# Patient Record
Sex: MALE | Race: WHITE | NOT HISPANIC OR LATINO | URBAN - METROPOLITAN AREA
[De-identification: names, ages, dates, MRNs, and addresses within clinical notes are randomized per-mention and may not be internally consistent; named-entity substitution may affect disease eponyms.]

---

## 2017-02-22 ENCOUNTER — OUTPATIENT (OUTPATIENT)
Dept: OUTPATIENT SERVICES | Facility: HOSPITAL | Age: 77
LOS: 1 days | End: 2017-02-22

## 2017-09-20 ENCOUNTER — APPOINTMENT (OUTPATIENT)
Dept: CARDIOLOGY | Facility: CLINIC | Age: 77
End: 2017-09-20
Payer: MEDICARE

## 2017-09-20 PROBLEM — Z00.00 ENCOUNTER FOR PREVENTIVE HEALTH EXAMINATION: Status: ACTIVE | Noted: 2017-09-20

## 2017-09-20 PROCEDURE — 99213 OFFICE O/P EST LOW 20 MIN: CPT

## 2017-09-20 PROCEDURE — 93000 ELECTROCARDIOGRAM COMPLETE: CPT

## 2017-09-28 ENCOUNTER — APPOINTMENT (OUTPATIENT)
Dept: CARDIOLOGY | Facility: CLINIC | Age: 77
End: 2017-09-28

## 2017-10-02 ENCOUNTER — APPOINTMENT (OUTPATIENT)
Dept: CARDIOLOGY | Facility: CLINIC | Age: 77
End: 2017-10-02
Payer: MEDICARE

## 2017-10-02 PROCEDURE — 93306 TTE W/DOPPLER COMPLETE: CPT

## 2017-10-05 ENCOUNTER — APPOINTMENT (OUTPATIENT)
Dept: CARDIOLOGY | Facility: CLINIC | Age: 77
End: 2017-10-05
Payer: MEDICARE

## 2017-10-05 PROCEDURE — 99213 OFFICE O/P EST LOW 20 MIN: CPT

## 2019-04-02 ENCOUNTER — NON-APPOINTMENT (OUTPATIENT)
Age: 79
End: 2019-04-02

## 2019-04-02 ENCOUNTER — APPOINTMENT (OUTPATIENT)
Dept: CARDIOLOGY | Facility: CLINIC | Age: 79
End: 2019-04-02
Payer: MEDICARE

## 2019-04-02 VITALS
HEART RATE: 68 BPM | HEIGHT: 69 IN | WEIGHT: 165 LBS | BODY MASS INDEX: 24.44 KG/M2 | OXYGEN SATURATION: 99 % | DIASTOLIC BLOOD PRESSURE: 60 MMHG | SYSTOLIC BLOOD PRESSURE: 136 MMHG

## 2019-04-02 PROCEDURE — 93000 ELECTROCARDIOGRAM COMPLETE: CPT

## 2019-04-02 PROCEDURE — 99215 OFFICE O/P EST HI 40 MIN: CPT

## 2019-04-02 RX ORDER — CHOLECALCIFEROL (VITAMIN D3) 125 MCG
5000 CAPSULE ORAL
Refills: 0 | Status: ACTIVE | COMMUNITY

## 2019-04-02 RX ORDER — OMEPRAZOLE 20 MG/1
20 CAPSULE, DELAYED RELEASE ORAL
Refills: 0 | Status: ACTIVE | COMMUNITY

## 2019-04-02 NOTE — PHYSICAL EXAM
[General Appearance - Well Developed] : well developed [Normal Appearance] : normal appearance [Well Groomed] : well groomed [General Appearance - Well Nourished] : well nourished [No Deformities] : no deformities [General Appearance - In No Acute Distress] : no acute distress [Normal Conjunctiva] : the conjunctiva exhibited no abnormalities [Eyelids - No Xanthelasma] : the eyelids demonstrated no xanthelasmas [Normal Oral Mucosa] : normal oral mucosa [No Oral Pallor] : no oral pallor [No Oral Cyanosis] : no oral cyanosis [Normal Jugular Venous A Waves Present] : normal jugular venous A waves present [Normal Jugular Venous V Waves Present] : normal jugular venous V waves present [No Jugular Venous Whittaker A Waves] : no jugular venous whittaker A waves [Respiration, Rhythm And Depth] : normal respiratory rhythm and effort [Exaggerated Use Of Accessory Muscles For Inspiration] : no accessory muscle use [Auscultation Breath Sounds / Voice Sounds] : lungs were clear to auscultation bilaterally [Heart Rate And Rhythm] : heart rate and rhythm were normal [Heart Sounds] : normal S1 and S2 [Murmurs] : no murmurs present [Abdomen Soft] : soft [Abdomen Tenderness] : non-tender [Abdomen Mass (___ Cm)] : no abdominal mass palpated [Abnormal Walk] : normal gait [Gait - Sufficient For Exercise Testing] : the gait was sufficient for exercise testing [Nail Clubbing] : no clubbing of the fingernails [Cyanosis, Localized] : no localized cyanosis [Petechial Hemorrhages (___cm)] : no petechial hemorrhages [Skin Color & Pigmentation] : normal skin color and pigmentation [] : no rash [No Venous Stasis] : no venous stasis [Skin Lesions] : no skin lesions [No Skin Ulcers] : no skin ulcer [No Xanthoma] : no  xanthoma was observed [Oriented To Time, Place, And Person] : oriented to person, place, and time [Affect] : the affect was normal [Mood] : the mood was normal [No Anxiety] : not feeling anxious

## 2019-04-03 NOTE — HISTORY OF PRESENT ILLNESS
[FreeTextEntry1] : HPI:\par Mr. Peacock is a very pleasant 78-year-old gentleman, came for cardiac evaluation.  His history includes hypertension, hematochromatosis, and GERD.  The patient is status post hip replacement.  The patient does not do exercise per se but fairly active.  He does get short of breath on more than usual exertion but denies any chest pain, PND, orthopnea, diaphoresis, dizziness, palpitations, or pedal edema.  He has Elliptical machine that he uses 20 minutes three times per week.  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  At one point, he was following with Hematologist , but he has stopped going to that.  He does go for blood test on a regular basis.  He denies history of CHF, MI, or syncope.  He is very compliant to medication and low-cholesterol diet.\par

## 2019-04-03 NOTE — ASSESSMENT
[FreeTextEntry1] : \par Hypertension, well controlled.  Continue current medications and low-salt diet.\par \par Shortness of breath on more than usual exertion along with history of hematochromatosis.  I have recommended echocardiogram for LV wall motion and LVEF to see any cardiomyopathy.\par \par GERD.  Diet has been discussed with him.\par \par Aggressive risk factor modification has been discussed with him at a great length.  He will be reevaluated by me after cardiac testing.\par

## 2019-04-17 ENCOUNTER — APPOINTMENT (OUTPATIENT)
Dept: CARDIOLOGY | Facility: CLINIC | Age: 79
End: 2019-04-17
Payer: MEDICARE

## 2019-04-17 PROCEDURE — 93306 TTE W/DOPPLER COMPLETE: CPT

## 2019-05-01 ENCOUNTER — APPOINTMENT (OUTPATIENT)
Dept: CARDIOLOGY | Facility: CLINIC | Age: 79
End: 2019-05-01
Payer: MEDICARE

## 2019-05-01 VITALS
DIASTOLIC BLOOD PRESSURE: 50 MMHG | WEIGHT: 165 LBS | OXYGEN SATURATION: 99 % | HEART RATE: 69 BPM | BODY MASS INDEX: 24.37 KG/M2 | SYSTOLIC BLOOD PRESSURE: 120 MMHG

## 2019-05-01 PROCEDURE — 99214 OFFICE O/P EST MOD 30 MIN: CPT

## 2020-05-08 DIAGNOSIS — Z87.39 PERSONAL HISTORY OF OTHER DISEASES OF THE MUSCULOSKELETAL SYSTEM AND CONNECTIVE TISSUE: ICD-10-CM

## 2020-05-08 DIAGNOSIS — Z87.898 PERSONAL HISTORY OF OTHER SPECIFIED CONDITIONS: ICD-10-CM

## 2020-05-08 DIAGNOSIS — Z85.46 PERSONAL HISTORY OF MALIGNANT NEOPLASM OF PROSTATE: ICD-10-CM

## 2020-05-08 DIAGNOSIS — Z87.891 PERSONAL HISTORY OF NICOTINE DEPENDENCE: ICD-10-CM

## 2020-05-08 DIAGNOSIS — Z72.89 OTHER PROBLEMS RELATED TO LIFESTYLE: ICD-10-CM

## 2020-05-10 ENCOUNTER — RESULT CHARGE (OUTPATIENT)
Age: 80
End: 2020-05-10

## 2020-05-11 ENCOUNTER — NON-APPOINTMENT (OUTPATIENT)
Age: 80
End: 2020-05-11

## 2020-05-11 ENCOUNTER — APPOINTMENT (OUTPATIENT)
Dept: CARDIOLOGY | Facility: CLINIC | Age: 80
End: 2020-05-11
Payer: MEDICARE

## 2020-05-11 VITALS
BODY MASS INDEX: 24.73 KG/M2 | HEIGHT: 69 IN | TEMPERATURE: 98 F | OXYGEN SATURATION: 98 % | SYSTOLIC BLOOD PRESSURE: 150 MMHG | DIASTOLIC BLOOD PRESSURE: 60 MMHG | WEIGHT: 167 LBS | HEART RATE: 63 BPM

## 2020-05-11 DIAGNOSIS — Z86.79 PERSONAL HISTORY OF OTHER DISEASES OF THE CIRCULATORY SYSTEM: ICD-10-CM

## 2020-05-11 PROCEDURE — 93000 ELECTROCARDIOGRAM COMPLETE: CPT

## 2020-05-11 PROCEDURE — 99214 OFFICE O/P EST MOD 30 MIN: CPT

## 2020-05-11 RX ORDER — LISINOPRIL 10 MG/1
10 TABLET ORAL DAILY
Refills: 0 | Status: DISCONTINUED | COMMUNITY
End: 2020-05-11

## 2021-04-16 ENCOUNTER — NON-APPOINTMENT (OUTPATIENT)
Age: 81
End: 2021-04-16

## 2021-04-16 ENCOUNTER — APPOINTMENT (OUTPATIENT)
Dept: CARDIOLOGY | Facility: CLINIC | Age: 81
End: 2021-04-16
Payer: MEDICARE

## 2021-04-16 VITALS
DIASTOLIC BLOOD PRESSURE: 60 MMHG | OXYGEN SATURATION: 98 % | HEIGHT: 68 IN | SYSTOLIC BLOOD PRESSURE: 158 MMHG | HEART RATE: 74 BPM | TEMPERATURE: 97.7 F | WEIGHT: 168 LBS | BODY MASS INDEX: 25.46 KG/M2

## 2021-04-16 DIAGNOSIS — R06.02 SHORTNESS OF BREATH: ICD-10-CM

## 2021-04-16 DIAGNOSIS — E83.119 HEMOCHROMATOSIS, UNSPECIFIED: ICD-10-CM

## 2021-04-16 DIAGNOSIS — I27.20 PULMONARY HYPERTENSION, UNSPECIFIED: ICD-10-CM

## 2021-04-16 DIAGNOSIS — K21.9 GASTRO-ESOPHAGEAL REFLUX DISEASE W/OUT ESOPHAGITIS: ICD-10-CM

## 2021-04-16 DIAGNOSIS — M19.90 UNSPECIFIED OSTEOARTHRITIS, UNSPECIFIED SITE: ICD-10-CM

## 2021-04-16 DIAGNOSIS — I10 ESSENTIAL (PRIMARY) HYPERTENSION: ICD-10-CM

## 2021-04-16 DIAGNOSIS — R01.1 CARDIAC MURMUR, UNSPECIFIED: ICD-10-CM

## 2021-04-16 PROCEDURE — 93000 ELECTROCARDIOGRAM COMPLETE: CPT

## 2021-04-16 PROCEDURE — 99215 OFFICE O/P EST HI 40 MIN: CPT

## 2021-04-16 RX ORDER — ACETAMINOPHEN 500 MG
1200-1000 TABLET ORAL
Refills: 0 | Status: DISCONTINUED | COMMUNITY
End: 2021-04-16

## 2021-04-16 RX ORDER — HYDROCORTISONE ACETATE 0.5 %
CREAM (GRAM) TOPICAL
Refills: 0 | Status: DISCONTINUED | COMMUNITY
End: 2021-04-16

## 2021-04-16 RX ORDER — UBIQUINOL 100 MG
CAPSULE ORAL
Refills: 0 | Status: DISCONTINUED | COMMUNITY
End: 2021-04-16

## 2021-05-10 ENCOUNTER — APPOINTMENT (OUTPATIENT)
Dept: CARDIOLOGY | Facility: CLINIC | Age: 81
End: 2021-05-10

## 2021-05-18 ENCOUNTER — APPOINTMENT (OUTPATIENT)
Dept: CARDIOLOGY | Facility: CLINIC | Age: 81
End: 2021-05-18

## 2022-05-09 ENCOUNTER — APPOINTMENT (OUTPATIENT)
Dept: ORTHOPEDIC SURGERY | Facility: CLINIC | Age: 82
End: 2022-05-09
Payer: MEDICARE

## 2022-05-09 VITALS — WEIGHT: 168 LBS | BODY MASS INDEX: 25.46 KG/M2 | HEIGHT: 68 IN

## 2022-05-09 PROCEDURE — 99214 OFFICE O/P EST MOD 30 MIN: CPT

## 2022-05-09 RX ORDER — LISINOPRIL AND HYDROCHLOROTHIAZIDE TABLETS 10; 12.5 MG/1; MG/1
10-12.5 TABLET ORAL
Qty: 90 | Refills: 0 | Status: ACTIVE | COMMUNITY
Start: 2021-10-14

## 2022-05-09 NOTE — HISTORY OF PRESENT ILLNESS
[de-identified] : Follow up bilateral knees and lumbar spine. Feels relief from bilateral CSI and left knee drainage. Experiences back pain physically getting out of bed and after going for a walk. Occasionally feels pain in his right joint, usually when sitting. Denies pain meds.

## 2022-05-09 NOTE — ASSESSMENT
[FreeTextEntry1] : Surgery - Laminectomy L2-4\par \par Recommend: - NSAID - Heating pad - Muscle relaxer - Core strengthening exercise - Hamstring stretching exercise Patient is given back rehabilitation exercise book. \par \par

## 2022-05-09 NOTE — DISCUSSION/SUMMARY
[Surgical risks reviewed] : Surgical risks reviewed [de-identified] : \par \par I discussed non operative and operative treatment options in great detail with the patient. I discussed treatment options, including but not limited to,\par 1. Non operative treatment - rest, NSAID, physical therapy etc.\par 2. Interventional treatment - injections etc.\par 3. Surgical treatment - Laminectomy L2-3, 3-4\par \par Patient wants to proceed with surgical intervention after failing nonoperative care. I had a lengthy discussion with the patient about the rational and goal of the surgery as well as expected outcome. I explained postoperative rehabilitation and recovery process to the patient. I discussed risks, benefits and alternatives of the procedure in detail with the patient. I counseled patient about risks and possible complications, including but not limited to, infection, bleeding, nerve injury, arterial and venous injury, single or multiple muscle group weakness, dural tear, CSF leak, psudomenigocele, arachnoditis, CSF fistula, meningitis, discitis, osteomyelitis, epidural hematoma, DVT, PE, CRPS, MI, paralysis, post laminectomy instability, need for fusion, adjacent segment disease, persistent symptoms and risks of anesthesia. I explained to the patient that the surgical outcome is unpredictable and there is no guarantee that the symptoms will resolve after the surgery. The patient understands and wishes to proceed. All questions were answered and patient was given information to review.\par \par

## 2022-05-09 NOTE — PHYSICAL EXAM
[Right] : right knee [NL (0)] : extension 0 degrees [5___] : hamstring 5[unfilled]/5 [] : ligamentously stable [TWNoteComboBox7] : flexion 100 degrees

## 2022-05-18 ENCOUNTER — NON-APPOINTMENT (OUTPATIENT)
Age: 82
End: 2022-05-18

## 2022-05-20 DIAGNOSIS — Z01.818 ENCOUNTER FOR OTHER PREPROCEDURAL EXAMINATION: ICD-10-CM

## 2022-06-01 ENCOUNTER — FORM ENCOUNTER (OUTPATIENT)
Age: 82
End: 2022-06-01

## 2022-06-03 ENCOUNTER — APPOINTMENT (OUTPATIENT)
Dept: ORTHOPEDIC SURGERY | Facility: HOSPITAL | Age: 82
End: 2022-06-03
Payer: MEDICARE

## 2022-06-03 PROCEDURE — 63048 LAM FACETEC &FORAMOT EA ADDL: CPT | Mod: AS

## 2022-06-03 PROCEDURE — 63047 LAM FACETEC & FORAMOT LUMBAR: CPT | Mod: AS

## 2022-06-03 PROCEDURE — 63048 LAM FACETEC &FORAMOT EA ADDL: CPT

## 2022-06-03 PROCEDURE — 63047 LAM FACETEC & FORAMOT LUMBAR: CPT

## 2022-06-20 ENCOUNTER — APPOINTMENT (OUTPATIENT)
Dept: ORTHOPEDIC SURGERY | Facility: CLINIC | Age: 82
End: 2022-06-20
Payer: MEDICARE

## 2022-06-20 VITALS — BODY MASS INDEX: 25.46 KG/M2 | HEIGHT: 68 IN | WEIGHT: 168 LBS

## 2022-06-20 PROCEDURE — 99024 POSTOP FOLLOW-UP VISIT: CPT

## 2022-06-20 NOTE — ASSESSMENT
[FreeTextEntry1] : Begin gentle ROM exercises, leg stretching/strengthening. \par \par Recommend: - NSAID - Heating pad - Muscle relaxer - Core strengthening exercise - Hamstring stretching exercise Patient is given back rehabilitation exercise book.\par \par Follow up in 1 month

## 2022-06-20 NOTE — HISTORY OF PRESENT ILLNESS
[de-identified] : S/P Laminectomy L2-3, L3-4 6/3/22. Denies fevers, chills, SOB. Pain is 1/10. Denies pain meds.

## 2022-08-01 ENCOUNTER — APPOINTMENT (OUTPATIENT)
Dept: ORTHOPEDIC SURGERY | Facility: CLINIC | Age: 82
End: 2022-08-01

## 2022-08-01 VITALS — BODY MASS INDEX: 25.46 KG/M2 | HEIGHT: 68 IN | WEIGHT: 168 LBS

## 2022-08-01 PROCEDURE — 99024 POSTOP FOLLOW-UP VISIT: CPT

## 2022-08-01 NOTE — HISTORY OF PRESENT ILLNESS
[de-identified] : S/P Laminectomy L2-3, L3-4 6/3/22. Feels back is doing well, has some pain on the right side. Wife has noticed the left leg "swinging outward" when walking. Patient does not complain of any pain or difficulty when walking. Denies pain meds.

## 2022-08-01 NOTE — ASSESSMENT
[FreeTextEntry1] : Continue HEP \par \par Recommend: - NSAID - Heating pad - Muscle relaxer - Core strengthening exercise - Hamstring stretching exercise Patient is given back rehabilitation exercise book.\par \par Follow up in 2 months

## 2022-10-03 ENCOUNTER — APPOINTMENT (OUTPATIENT)
Dept: ORTHOPEDIC SURGERY | Facility: CLINIC | Age: 82
End: 2022-10-03

## 2022-10-03 VITALS — BODY MASS INDEX: 25.46 KG/M2 | WEIGHT: 168 LBS | HEIGHT: 68 IN

## 2022-10-03 PROCEDURE — 99214 OFFICE O/P EST MOD 30 MIN: CPT

## 2022-10-03 NOTE — HISTORY OF PRESENT ILLNESS
[de-identified] : S/P Laminectomy L2-3, L3-4 6/3/22. Feels a knot in right buttock first thing in the morning and after prolonged sitting. Denies pain meds.

## 2022-10-03 NOTE — ASSESSMENT
[FreeTextEntry1] : Had a discussion with the patient regarding the importance of stretching his back daily.  Patient understands and will begin stretching exercises in the morning after using a heating pad. \par \par Recommend: - NSAID - Heating pad - Muscle relaxer - Core strengthening exercise - Hamstring stretching exercise Patient is given back rehabilitation exercise book.\par \par Follow up in 2 months

## 2022-10-31 ENCOUNTER — APPOINTMENT (OUTPATIENT)
Dept: ORTHOPEDIC SURGERY | Facility: CLINIC | Age: 82
End: 2022-10-31

## 2022-10-31 VITALS — BODY MASS INDEX: 25.46 KG/M2 | WEIGHT: 168 LBS | HEIGHT: 68 IN

## 2022-10-31 DIAGNOSIS — M17.11 UNILATERAL PRIMARY OSTEOARTHRITIS, RIGHT KNEE: ICD-10-CM

## 2022-10-31 PROCEDURE — 99214 OFFICE O/P EST MOD 30 MIN: CPT

## 2022-10-31 NOTE — HISTORY OF PRESENT ILLNESS
[de-identified] : Follow up lumbar spine. Feels improvement since last visit. Denies pain meds. Using Voltaren gel.

## 2022-10-31 NOTE — ASSESSMENT
[FreeTextEntry1] : Had a discussion with the patient regarding the importance of stretching his back daily.  Patient understands and will begin stretching exercises in the morning after using a heating pad. \par \par Continue HEP\par \par Recommend: - NSAID - Heating pad - Muscle relaxer - Core strengthening exercise - Hamstring stretching exercise Patient is given back rehabilitation exercise book.\par \par Follow up PRN

## 2022-12-10 ENCOUNTER — OFFICE (OUTPATIENT)
Dept: URBAN - METROPOLITAN AREA CLINIC 97 | Facility: CLINIC | Age: 82
Setting detail: OPHTHALMOLOGY
End: 2022-12-10
Payer: MEDICARE

## 2022-12-10 ENCOUNTER — RX ONLY (RX ONLY)
Age: 82
End: 2022-12-10

## 2022-12-10 DIAGNOSIS — H40.1131: ICD-10-CM

## 2022-12-10 DIAGNOSIS — H02.834: ICD-10-CM

## 2022-12-10 DIAGNOSIS — H25.13: ICD-10-CM

## 2022-12-10 DIAGNOSIS — H02.831: ICD-10-CM

## 2022-12-10 PROCEDURE — 99214 OFFICE O/P EST MOD 30 MIN: CPT | Performed by: OPHTHALMOLOGY

## 2022-12-10 PROCEDURE — 92133 CPTRZD OPH DX IMG PST SGM ON: CPT | Performed by: OPHTHALMOLOGY

## 2022-12-10 PROCEDURE — 76514 ECHO EXAM OF EYE THICKNESS: CPT | Performed by: OPHTHALMOLOGY

## 2022-12-10 ASSESSMENT — REFRACTION_MANIFEST
OU_VA: 20/20
OD_CYLINDER: -1.25
OS_SPHERE: -2.00
OS_VA1: 20/20-
OS_VA2: 20/20(J1+)
OD_VA1: 20/20-
OD_ADD: +2.50
OS_AXIS: 105
OD_SPHERE: -1.50
OS_AXIS: 105
OS_CYLINDER: -1.50
OD_VA1: 20/20-
OD_ADD: +2.50
OS_CYLINDER: -1.50
OS_VA2: 20/20(J1+)
OD_AXIS: 085
OS_ADD: +2.50
OD_AXIS: 085
OD_VA2: 20/20(J1+)
OU_VA: 20/20
OD_CYLINDER: -1.50
OD_SPHERE: -1.50
OS_VA1: 20/20-
OS_ADD: +2.50
OS_SPHERE: -2.00
OD_VA2: 20/20(J1+)

## 2022-12-10 ASSESSMENT — LID POSITION - DERMATOCHALASIS
OS_DERMATOCHALASIS: LUL
OD_DERMATOCHALASIS: RUL

## 2022-12-10 ASSESSMENT — KERATOMETRY
OD_K2POWER_DIOPTERS: 43.50
OD_AXISANGLE_DEGREES: 179
METHOD_AUTO_MANUAL: AUTO
OS_K2POWER_DIOPTERS: 43.75
OS_AXISANGLE_DEGREES: 020
OS_K1POWER_DIOPTERS: 43.50
OD_K1POWER_DIOPTERS: 43.25

## 2022-12-10 ASSESSMENT — SPHEQUIV_DERIVED
OD_SPHEQUIV: -2.25
OD_SPHEQUIV: -2.375
OS_SPHEQUIV: -2.75
OS_SPHEQUIV: -2.75
OS_SPHEQUIV: -2.625
OD_SPHEQUIV: -2.125

## 2022-12-10 ASSESSMENT — REFRACTION_AUTOREFRACTION
OS_CYLINDER: -1.75
OD_SPHERE: -1.75
OS_AXIS: 103
OD_CYLINDER: -1.25
OS_SPHERE: -1.75
OD_AXIS: 085

## 2022-12-10 ASSESSMENT — REFRACTION_CURRENTRX
OD_SPHERE: -1.75
OS_OVR_VA: 20/
OS_ADD: +2.50
OS_AXIS: 109
OD_ADD: +2.50
OD_VPRISM_DIRECTION: PROGS
OD_CYLINDER: -1.00
OS_VPRISM_DIRECTION: PROGS
OS_SPHERE: -1.75
OD_AXIS: 85
OS_CYLINDER: -1.50
OD_OVR_VA: 20/

## 2022-12-10 ASSESSMENT — CONFRONTATIONAL VISUAL FIELD TEST (CVF)
OD_FINDINGS: FULL
OS_FINDINGS: FULL

## 2022-12-10 ASSESSMENT — PACHYMETRY
OD_CT_CORRECTION: 4
OS_CT_UM: 488
OD_CT_UM: 491
OS_CT_CORRECTION: 4

## 2022-12-10 ASSESSMENT — TONOMETRY
OS_IOP_MMHG: 17
OD_IOP_MMHG: 17

## 2022-12-10 ASSESSMENT — VISUAL ACUITY
OS_BCVA: 20/25
OD_BCVA: 20/25

## 2022-12-10 ASSESSMENT — AXIALLENGTH_DERIVED
OD_AL: 24.4993
OS_AL: 24.664
OD_AL: 24.6048
OS_AL: 24.6109
OD_AL: 24.552
OS_AL: 24.664

## 2022-12-10 ASSESSMENT — PUNCTA - ASSESSMENT: OD_PUNCTA: SCLEROSED RLL

## 2023-03-13 ENCOUNTER — APPOINTMENT (OUTPATIENT)
Dept: ORTHOPEDIC SURGERY | Facility: CLINIC | Age: 83
End: 2023-03-13
Payer: MEDICARE

## 2023-03-13 PROCEDURE — 99214 OFFICE O/P EST MOD 30 MIN: CPT | Mod: 25

## 2023-03-13 PROCEDURE — 73562 X-RAY EXAM OF KNEE 3: CPT | Mod: FY,LT

## 2023-03-13 PROCEDURE — 20610 DRAIN/INJ JOINT/BURSA W/O US: CPT | Mod: LT

## 2023-03-13 NOTE — ASSESSMENT
[FreeTextEntry1] : Recommend: - rest - ice - compression - elevation \par \par Follow up in 2 months

## 2023-03-13 NOTE — IMAGING
[Left] : left knee [AP] : anteroposterior [Lateral] : lateral [[place]] : skyline [FreeTextEntry9] : DJD, chondrocalcinosis

## 2023-03-13 NOTE — PROCEDURE
[Large Joint Injection] : Large joint injection [Left] : of the left [Knee] : knee [Pain] : pain [Inflammation] : inflammation [X-ray evidence of Osteoarthritis on this or prior visit] : x-ray evidence of Osteoarthritis on this or prior visit [Betadine] : betadine [Ethyl Chloride sprayed topically] : ethyl chloride sprayed topically [Sterile technique used] : sterile technique used [___ cc    1%] : Lidocaine ~Vcc of 1%  [___ cc    40mg] : Triamcinolone (Kenalog) ~Vcc of 40 mg  [] : Patient tolerated procedure well [Call if redness, pain or fever occur] : call if redness, pain or fever occur [Apply ice for 15min out of every hour for the next 12-24 hours as tolerated] : apply ice for 15 minutes out of every hour for the next 12-24 hours as tolerated [Patient was advised to rest the joint(s) for ____ days] : patient was advised to rest the joint(s) for [unfilled] days [Previous OTC use and PT nontherapeutic] : patient has tried OTC's including aspirin, Ibuprofen, Aleve, etc or prescription NSAIDS, and/or exercises at home and/or physical therapy without satisfactory response [Patient had decreased mobility in the joint] : patient had decreased mobility in the joint [Risks, benefits, alternatives discussed / Verbal consent obtained] : the risks benefits, and alternatives have been discussed, and verbal consent was obtained

## 2023-03-27 ENCOUNTER — APPOINTMENT (OUTPATIENT)
Dept: ORTHOPEDIC SURGERY | Facility: CLINIC | Age: 83
End: 2023-03-27
Payer: MEDICARE

## 2023-03-27 PROCEDURE — 99214 OFFICE O/P EST MOD 30 MIN: CPT

## 2023-03-27 PROCEDURE — 73503 X-RAY EXAM HIP UNI 4/> VIEWS: CPT | Mod: RT

## 2023-03-27 NOTE — HISTORY OF PRESENT ILLNESS
[de-identified] : Follow up lumbar spine. \par Pt reports lower back pain radiating into RIGHT side of groin x~2 weeks. \par Reports pain is worse when rising to a standing position- states he has trouble standing up. \par Pt reports relief with Aleve.\par Pt reports some improvement when massaging the thigh. \par Pt denies recent imaging of the hips- pt had LEFT DEBBIE in ~2009

## 2023-03-27 NOTE — ASSESSMENT
[FreeTextEntry1] : Referral to Interventional radiology for right hip intraarticular corticosteroid injection \par \par Continue HEP\par \par Recommend: - NSAID - Heating pad - Muscle relaxer - Core strengthening exercise - Hamstring stretching exercise Patient is given back rehabilitation exercise book.\par \par Please call and make and appointment to follow up after injection

## 2023-03-27 NOTE — PHYSICAL EXAM
[Right] : right hip [] : no palpable masses [5___] : adduction 5[unfilled]/5 [de-identified] : external rotation 30 degrees [TWNoteComboBox6] : internal rotation 20 degrees

## 2023-04-02 ENCOUNTER — FORM ENCOUNTER (OUTPATIENT)
Age: 83
End: 2023-04-02

## 2023-04-08 ENCOUNTER — OFFICE (OUTPATIENT)
Dept: URBAN - METROPOLITAN AREA CLINIC 97 | Facility: CLINIC | Age: 83
Setting detail: OPHTHALMOLOGY
End: 2023-04-08
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
## 2023-04-08 PROCEDURE — NO SHOW FE NO SHOW FEE: Performed by: OPHTHALMOLOGY

## 2023-04-23 ENCOUNTER — NON-APPOINTMENT (OUTPATIENT)
Age: 83
End: 2023-04-23

## 2023-05-15 ENCOUNTER — APPOINTMENT (OUTPATIENT)
Dept: ORTHOPEDIC SURGERY | Facility: CLINIC | Age: 83
End: 2023-05-15
Payer: MEDICARE

## 2023-05-15 VITALS — HEIGHT: 68 IN | BODY MASS INDEX: 25.46 KG/M2 | WEIGHT: 168 LBS

## 2023-05-15 DIAGNOSIS — M17.12 UNILATERAL PRIMARY OSTEOARTHRITIS, LEFT KNEE: ICD-10-CM

## 2023-05-15 PROCEDURE — 99214 OFFICE O/P EST MOD 30 MIN: CPT | Mod: 25

## 2023-05-15 PROCEDURE — 20610 DRAIN/INJ JOINT/BURSA W/O US: CPT | Mod: LT

## 2023-05-15 NOTE — HISTORY OF PRESENT ILLNESS
[de-identified] : Follow up left knee. States he feels pain with twisting. Denies taking pain medication.

## 2023-05-15 NOTE — PROCEDURE
[Large Joint Injection] : Large joint injection [Left] : of the left [Knee] : knee [Pain] : pain [Inflammation] : inflammation [X-ray evidence of Osteoarthritis on this or prior visit] : x-ray evidence of Osteoarthritis on this or prior visit [Betadine] : betadine [Ethyl Chloride sprayed topically] : ethyl chloride sprayed topically [Sterile technique used] : sterile technique used [Monovisc (88mg)] : 88mg of Monovisc [] : Patient tolerated procedure well [Call if redness, pain or fever occur] : call if redness, pain or fever occur [Apply ice for 15min out of every hour for the next 12-24 hours as tolerated] : apply ice for 15 minutes out of every hour for the next 12-24 hours as tolerated [Patient was advised to rest the joint(s) for ____ days] : patient was advised to rest the joint(s) for [unfilled] days [Previous OTC use and PT nontherapeutic] : patient has tried OTC's including aspirin, Ibuprofen, Aleve, etc or prescription NSAIDS, and/or exercises at home and/or physical therapy without satisfactory response [Patient had decreased mobility in the joint] : patient had decreased mobility in the joint [Risks, benefits, alternatives discussed / Verbal consent obtained] : the risks benefits, and alternatives have been discussed, and verbal consent was obtained

## 2023-07-08 ENCOUNTER — OFFICE (OUTPATIENT)
Dept: URBAN - METROPOLITAN AREA CLINIC 97 | Facility: CLINIC | Age: 83
Setting detail: OPHTHALMOLOGY
End: 2023-07-08
Payer: MEDICARE

## 2023-07-08 DIAGNOSIS — H02.831: ICD-10-CM

## 2023-07-08 DIAGNOSIS — H21.563: ICD-10-CM

## 2023-07-08 DIAGNOSIS — H35.3131: ICD-10-CM

## 2023-07-08 DIAGNOSIS — H25.13: ICD-10-CM

## 2023-07-08 DIAGNOSIS — H02.834: ICD-10-CM

## 2023-07-08 DIAGNOSIS — H40.1131: ICD-10-CM

## 2023-07-08 PROCEDURE — 99214 OFFICE O/P EST MOD 30 MIN: CPT | Performed by: OPHTHALMOLOGY

## 2023-07-08 PROCEDURE — 92083 EXTENDED VISUAL FIELD XM: CPT | Performed by: OPHTHALMOLOGY

## 2023-07-08 PROCEDURE — 92133 CPTRZD OPH DX IMG PST SGM ON: CPT | Performed by: OPHTHALMOLOGY

## 2023-07-08 ASSESSMENT — REFRACTION_MANIFEST
OD_CYLINDER: -1.25
OS_VA2: 20/20(J1+)
OD_SPHERE: -1.50
OS_ADD: +2.50
OS_AXIS: 105
OD_CYLINDER: -1.50
OS_VA1: 20/20-
OD_AXIS: 085
OD_VA1: 20/20-
OD_ADD: +2.50
OS_AXIS: 105
OU_VA: 20/20
OS_SPHERE: -2.00
OS_VA2: 20/20(J1+)
OU_VA: 20/20
OD_AXIS: 085
OS_SPHERE: -2.00
OS_VA1: 20/20-
OS_CYLINDER: -1.50
OD_SPHERE: -1.50
OD_VA1: 20/20-
OD_ADD: +2.50
OD_VA2: 20/20(J1+)
OS_CYLINDER: -1.50
OD_VA2: 20/20(J1+)
OS_ADD: +2.50

## 2023-07-08 ASSESSMENT — VISUAL ACUITY
OS_BCVA: 20/20-2
OD_BCVA: 20/25

## 2023-07-08 ASSESSMENT — SPHEQUIV_DERIVED
OS_SPHEQUIV: -2.75
OS_SPHEQUIV: -2.75
OS_SPHEQUIV: -2.625
OD_SPHEQUIV: -2.25
OD_SPHEQUIV: -2.125
OD_SPHEQUIV: -2.375

## 2023-07-08 ASSESSMENT — AXIALLENGTH_DERIVED
OD_AL: 24.4993
OS_AL: 24.664
OD_AL: 24.552
OS_AL: 24.664
OD_AL: 24.6048
OS_AL: 24.6109

## 2023-07-08 ASSESSMENT — KERATOMETRY
OS_K1POWER_DIOPTERS: 43.50
METHOD_AUTO_MANUAL: AUTO
OD_K1POWER_DIOPTERS: 43.25
OS_AXISANGLE_DEGREES: 020
OS_K2POWER_DIOPTERS: 43.75
OD_AXISANGLE_DEGREES: 179
OD_K2POWER_DIOPTERS: 43.50

## 2023-07-08 ASSESSMENT — PUNCTA - ASSESSMENT: OD_PUNCTA: SCLEROSED RLL

## 2023-07-08 ASSESSMENT — CONFRONTATIONAL VISUAL FIELD TEST (CVF)
OS_FINDINGS: FULL
OD_FINDINGS: FULL

## 2023-07-08 ASSESSMENT — REFRACTION_AUTOREFRACTION
OS_AXIS: 103
OS_CYLINDER: -1.75
OS_SPHERE: -1.75
OD_SPHERE: -1.75
OD_CYLINDER: -1.25
OD_AXIS: 085

## 2023-07-08 ASSESSMENT — REFRACTION_CURRENTRX
OD_CYLINDER: -1.00
OD_OVR_VA: 20/
OD_VPRISM_DIRECTION: PROGS
OD_ADD: +2.50
OS_SPHERE: -1.75
OS_CYLINDER: -1.50
OS_ADD: +2.50
OS_VPRISM_DIRECTION: PROGS
OD_AXIS: 85
OS_AXIS: 109
OS_OVR_VA: 20/
OD_SPHERE: -1.75

## 2023-07-08 ASSESSMENT — LID POSITION - DERMATOCHALASIS
OS_DERMATOCHALASIS: LUL
OD_DERMATOCHALASIS: RUL

## 2023-07-08 ASSESSMENT — PACHYMETRY
OD_CT_UM: 491
OS_CT_UM: 488
OD_CT_CORRECTION: 4
OS_CT_CORRECTION: 4

## 2023-07-23 ENCOUNTER — FORM ENCOUNTER (OUTPATIENT)
Age: 83
End: 2023-07-23

## 2023-08-28 ENCOUNTER — APPOINTMENT (OUTPATIENT)
Dept: ORTHOPEDIC SURGERY | Facility: CLINIC | Age: 83
End: 2023-08-28
Payer: MEDICARE

## 2023-08-28 VITALS — HEIGHT: 68 IN | BODY MASS INDEX: 25.46 KG/M2 | WEIGHT: 168 LBS

## 2023-08-28 DIAGNOSIS — M47.817 SPONDYLOSIS W/OUT MYELOPATHY OR RADICULOPATHY, LUMBOSACRAL REGION: ICD-10-CM

## 2023-08-28 DIAGNOSIS — M51.36 OTHER INTERVERTEBRAL DISC DEGENERATION, LUMBAR REGION: ICD-10-CM

## 2023-08-28 DIAGNOSIS — M51.37 OTHER INTERVERTEBRAL DISC DEGENERATION, LUMBOSACRAL REGION: ICD-10-CM

## 2023-08-28 DIAGNOSIS — M85.60 OTHER CYST OF BONE, UNSPECIFIED SITE: ICD-10-CM

## 2023-08-28 DIAGNOSIS — M48.061 SPINAL STENOSIS, LUMBAR REGION WITHOUT NEUROGENIC CLAUDICATION: ICD-10-CM

## 2023-08-28 DIAGNOSIS — G95.19 SPINAL STENOSIS, LUMBOSACRAL REGION: ICD-10-CM

## 2023-08-28 DIAGNOSIS — Z98.890 OTHER SPECIFIED POSTPROCEDURAL STATES: ICD-10-CM

## 2023-08-28 DIAGNOSIS — M48.07 SPINAL STENOSIS, LUMBOSACRAL REGION: ICD-10-CM

## 2023-08-28 PROCEDURE — 99214 OFFICE O/P EST MOD 30 MIN: CPT

## 2023-08-28 NOTE — DATA REVIEWED
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

## 2023-08-28 NOTE — ASSESSMENT
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   Patient will follow up with Dr. Bah for management of R hip

## 2023-08-28 NOTE — HISTORY OF PRESENT ILLNESS
[de-identified] : Follow up lumbar spine and right hip MRI at [facility]. Patient had a right hip CSI ~2 months ago with 0% relief. Patient states he has groin pain while walking. Patient admits to taking Aleve PRN for pain.  Today's Pain 10/10

## 2023-09-07 ENCOUNTER — APPOINTMENT (OUTPATIENT)
Dept: ORTHOPEDIC SURGERY | Facility: CLINIC | Age: 83
End: 2023-09-07
Payer: MEDICARE

## 2023-09-07 PROCEDURE — 99213 OFFICE O/P EST LOW 20 MIN: CPT

## 2023-09-07 NOTE — PHYSICAL EXAM
[de-identified] : Constitutional: The patient appears well developed, well nourished. Examination of patients ability to communicate functionally was normal.   Neurologic: Coordination is normal. Alert and oriented to time, place and person. No evidence of mood disorder, calm affect.     RIGHT    HIP/THIGH : Inspection of the hip/thigh is as follows: Inspection shows no swelling, no ecchymosis, no erythema, no rashes and no masses.   Palpation of the hip/thigh is as follows: groin tenderness. no palpable defects and no palpable masses.   Range of motion of the hip is as follows in degrees:  5 degree hip flexion CONTRACTURE Flexion: 90   Abduction:  20   External rotation:  30  Internal rotaion:  5  Stength testing of the hip/thigh is as follows: Hip flexion strength:   5/5  Hip extension strength:  5/5  Hip abductionstrength:  5/5 Hip adductionstrength:  5/5  Neurological testing of the hip/thigh is as follows: motor exam 5/5 throughout, light touch intact throughout and no focal motor defecits.   Gait and function is as follows: antalgic gait.    [Right] : right hip with pelvis [FreeTextEntry9] : Past xrays taken in office show Left DEBBIE metha stem and Right severe hip DJD no frx noted

## 2023-09-07 NOTE — DISCUSSION/SUMMARY
[de-identified] : Patient was given a booking form for RT Hip DEBBIE posterior approach.  The Risks, benefits, alternatives and expectations of the proposed procedure, as well as non-operative management, were discussed at length with the patient, as well as the procedure itself and the expected recovery period. The possible need for post operative Physical Therapy was also discussed. The patient was allowed as much tome as necessary to ask all appropriate questions and they were answered to the patient's satisfaction   options were discussed . He wants to proceed with Right DEBBIE posterior approach. He will have to delay until October due to [place] OLY Payne acting as scribe.

## 2023-10-31 ENCOUNTER — APPOINTMENT (OUTPATIENT)
Dept: ORTHOPEDIC SURGERY | Facility: HOSPITAL | Age: 83
End: 2023-10-31
Payer: MEDICARE

## 2023-10-31 PROCEDURE — 27130 TOTAL HIP ARTHROPLASTY: CPT | Mod: AS,RT

## 2023-10-31 PROCEDURE — 27130 TOTAL HIP ARTHROPLASTY: CPT | Mod: RT

## 2023-11-13 ENCOUNTER — APPOINTMENT (OUTPATIENT)
Dept: ORTHOPEDIC SURGERY | Facility: CLINIC | Age: 83
End: 2023-11-13

## 2023-12-07 ENCOUNTER — APPOINTMENT (OUTPATIENT)
Dept: ORTHOPEDIC SURGERY | Facility: CLINIC | Age: 83
End: 2023-12-07
Payer: MEDICARE

## 2023-12-07 PROCEDURE — 99024 POSTOP FOLLOW-UP VISIT: CPT

## 2023-12-07 PROCEDURE — 73502 X-RAY EXAM HIP UNI 2-3 VIEWS: CPT | Mod: FY

## 2024-01-05 ENCOUNTER — OFFICE (OUTPATIENT)
Dept: URBAN - METROPOLITAN AREA CLINIC 97 | Facility: CLINIC | Age: 84
Setting detail: OPHTHALMOLOGY
End: 2024-01-05
Payer: MEDICARE

## 2024-01-05 DIAGNOSIS — H21.563: ICD-10-CM

## 2024-01-05 DIAGNOSIS — H35.3131: ICD-10-CM

## 2024-01-05 DIAGNOSIS — H02.834: ICD-10-CM

## 2024-01-05 DIAGNOSIS — H25.13: ICD-10-CM

## 2024-01-05 DIAGNOSIS — H02.831: ICD-10-CM

## 2024-01-05 DIAGNOSIS — H40.1131: ICD-10-CM

## 2024-01-05 PROCEDURE — 92014 COMPRE OPH EXAM EST PT 1/>: CPT | Performed by: OPHTHALMOLOGY

## 2024-01-05 PROCEDURE — 92134 CPTRZ OPH DX IMG PST SGM RTA: CPT | Performed by: OPHTHALMOLOGY

## 2024-01-05 ASSESSMENT — REFRACTION_CURRENTRX
OS_SPHERE: -3.25
OD_VPRISM_DIRECTION: PROGS
OS_CYLINDER: +1.50
OS_OVR_VA: 20/
OD_OVR_VA: 20/
OS_VPRISM_DIRECTION: PROGS
OD_AXIS: 176
OD_CYLINDER: +1.00
OS_ADD: +2.50
OD_ADD: +2.50
OD_SPHERE: -3.00
OS_AXIS: 015

## 2024-01-05 ASSESSMENT — REFRACTION_MANIFEST
OS_CYLINDER: -1.50
OD_VA2: 20/20(J1+)
OD_CYLINDER: -1.25
OS_VA1: 20/20-
OD_CYLINDER: -1.50
OU_VA: 20/20
OD_VA2: 20/20(J1+)
OD_AXIS: 085
OS_SPHERE: -2.00
OD_SPHERE: -1.50
OD_ADD: +2.50
OS_VA1: 20/20-
OS_VA2: 20/20(J1+)
OD_AXIS: 085
OS_ADD: +2.50
OD_VA1: 20/20-
OS_VA2: 20/20(J1+)
OS_AXIS: 105
OU_VA: 20/20
OS_AXIS: 105
OS_ADD: +2.50
OD_ADD: +2.50
OS_SPHERE: -2.00
OD_SPHERE: -1.50
OD_VA1: 20/20-
OS_CYLINDER: -1.50

## 2024-01-05 ASSESSMENT — REFRACTION_AUTOREFRACTION
OS_AXIS: 007
OD_SPHERE: -4.50
OD_AXIS: 142
OS_CYLINDER: +2.00
OS_SPHERE: -3.75
OD_CYLINDER: +1.25

## 2024-01-05 ASSESSMENT — LID POSITION - DERMATOCHALASIS
OS_DERMATOCHALASIS: LUL
OD_DERMATOCHALASIS: RUL

## 2024-01-05 ASSESSMENT — SPHEQUIV_DERIVED
OS_SPHEQUIV: -2.75
OD_SPHEQUIV: -2.125
OS_SPHEQUIV: -2.75
OD_SPHEQUIV: -2.25
OS_SPHEQUIV: -2.75
OD_SPHEQUIV: -3.875

## 2024-01-05 ASSESSMENT — CONFRONTATIONAL VISUAL FIELD TEST (CVF)
OD_FINDINGS: FULL
OS_FINDINGS: FULL

## 2024-01-05 ASSESSMENT — PUNCTA - ASSESSMENT: OD_PUNCTA: SCLEROSED RLL

## 2024-01-31 NOTE — PHYSICAL EXAM
[de-identified] : Constitutional: The patient appears well developed, well nourished.       Neurologic: Coordination is normal. Alert and oriented to time, place and person. No evidence of mood disorder, calm affect.           RIGHT  HIP/THIGH:  Inspection of the hip/thigh is as follows: Inspection shows mild swelling,  NO ecchymosis laterally and   NO ecchymosis over buttock. Inspection shows no erythema and no rashes.    Inspection of the wound reveals: WOUNDS WELL HEALED>     Palpation of the hip/thigh is as follows: Thigh/calf soft NT       Range of motion of the hip is as follows in degrees:    Flexion:  90    Abduction:  30   External rotation:  30      Strength testing of the hip/thigh is as follows:    Hip flexion strength:   5/5   Hip extension strength:  5/5    Hip abduction strength:  5/5     Hip adduction strength:  5/5      Neurological testing of the hip/thigh is as follows: motor exam 5/5 throughout, light touch intact throughout and no focal motor defecits.       Gait and function is as follows: mild antalgic gait    Vascularity of the hip/thigh is as follows: Dorsalis pedis 2+ and Posterior tibial 2+

## 2024-01-31 NOTE — HISTORY OF PRESENT ILLNESS
[de-identified] : following up for the right hip. Patient is s/p R DEBBIE 10/31/2023.  Patient went to TCU after [facility] then had home PT until 2 weeks ago.  he has not been to outpatient PT.  He notes no c/o pain.  he is walking daily.  He ambulates without assistive device.

## 2024-02-01 ENCOUNTER — APPOINTMENT (OUTPATIENT)
Dept: ORTHOPEDIC SURGERY | Facility: CLINIC | Age: 84
End: 2024-02-01
Payer: MEDICARE

## 2024-02-01 DIAGNOSIS — M16.11 UNILATERAL PRIMARY OSTEOARTHRITIS, RIGHT HIP: ICD-10-CM

## 2024-02-01 DIAGNOSIS — M11.262 OTHER CHONDROCALCINOSIS, LEFT KNEE: ICD-10-CM

## 2024-02-01 PROCEDURE — 99213 OFFICE O/P EST LOW 20 MIN: CPT

## 2024-02-01 NOTE — HISTORY OF PRESENT ILLNESS
[de-identified] : following up for the right hip. Patient is s/p R DEBBIE 10/31/2023. He states the hip has been doing In addition the patient states the left knee has been bothering him. He states Dr. Kennedy injected it once with Monovisc on 5/15/2023 which provided mild to moderate relief.

## 2024-02-01 NOTE — DISCUSSION/SUMMARY
[de-identified] : Extensive discussion of the options was had with the patient regarding the left knee. This discussion included both surgical and nonsurgical options. Options including but not limited to cortisone injection, viscosupplementation, physical therapy, oral anti-inflammatories, MRI, arthroplasty VS arthroscopy were discussed with the patient. We also discussed the option of observation, allowing the patient to continue rest, ice, NSAIDs and following up if the condition does not improve. Time was taken to go over any questions the patient had in regards to any of the treatment plans described. PLan is for observation at this time for the left knee. He is very happy overall with the right hip. He will follow up in October.

## 2024-02-16 ENCOUNTER — RX ONLY (RX ONLY)
Age: 84
End: 2024-02-16

## 2024-02-16 ENCOUNTER — OFFICE (OUTPATIENT)
Dept: URBAN - METROPOLITAN AREA CLINIC 97 | Facility: CLINIC | Age: 84
Setting detail: OPHTHALMOLOGY
End: 2024-02-16
Payer: MEDICARE

## 2024-02-16 DIAGNOSIS — H40.1131: ICD-10-CM

## 2024-02-16 PROCEDURE — 99213 OFFICE O/P EST LOW 20 MIN: CPT | Performed by: OPHTHALMOLOGY

## 2024-02-16 PROCEDURE — 92133 CPTRZD OPH DX IMG PST SGM ON: CPT | Performed by: OPHTHALMOLOGY

## 2024-02-16 ASSESSMENT — REFRACTION_MANIFEST
OD_CYLINDER: -1.25
OD_ADD: +2.50
OD_VA1: 20/20-
OS_VA1: 20/20-
OD_AXIS: 085
OS_VA2: 20/20(J1+)
OS_ADD: +2.50
OS_VA1: 20/20-
OD_AXIS: 085
OD_SPHERE: -1.50
OD_VA2: 20/20(J1+)
OS_CYLINDER: -1.50
OU_VA: 20/20
OS_ADD: +2.50
OU_VA: 20/20
OS_AXIS: 105
OS_AXIS: 105
OS_SPHERE: -2.00
OS_CYLINDER: -1.50
OD_VA1: 20/20-
OS_SPHERE: -2.00
OS_VA2: 20/20(J1+)
OD_VA2: 20/20(J1+)
OD_CYLINDER: -1.50
OD_ADD: +2.50
OD_SPHERE: -1.50

## 2024-02-16 ASSESSMENT — SPHEQUIV_DERIVED
OS_SPHEQUIV: -2.75
OS_SPHEQUIV: -2.75
OD_SPHEQUIV: -2.25
OD_SPHEQUIV: -3.875
OS_SPHEQUIV: -2.75
OD_SPHEQUIV: -2.125

## 2024-02-16 ASSESSMENT — REFRACTION_AUTOREFRACTION
OD_CYLINDER: +1.25
OS_AXIS: 007
OS_CYLINDER: +2.00
OD_SPHERE: -4.50
OD_AXIS: 142
OS_SPHERE: -3.75

## 2024-02-16 ASSESSMENT — REFRACTION_CURRENTRX
OD_AXIS: 176
OS_ADD: +2.50
OS_OVR_VA: 20/
OS_SPHERE: -3.25
OD_OVR_VA: 20/
OS_CYLINDER: +1.50
OD_ADD: +2.50
OD_SPHERE: -3.00
OS_AXIS: 015
OD_CYLINDER: +1.00
OD_VPRISM_DIRECTION: PROGS
OS_VPRISM_DIRECTION: PROGS

## 2024-02-16 ASSESSMENT — LID POSITION - DERMATOCHALASIS
OS_DERMATOCHALASIS: LUL
OD_DERMATOCHALASIS: RUL

## 2024-02-16 ASSESSMENT — CONFRONTATIONAL VISUAL FIELD TEST (CVF)
OS_FINDINGS: FULL
OD_FINDINGS: FULL

## 2024-02-16 ASSESSMENT — PUNCTA - ASSESSMENT: OD_PUNCTA: SCLEROSED RLL

## 2024-02-21 ENCOUNTER — APPOINTMENT (OUTPATIENT)
Dept: ORTHOPEDIC SURGERY | Facility: CLINIC | Age: 84
End: 2024-02-21
Payer: MEDICARE

## 2024-02-21 DIAGNOSIS — M75.100 UNSPECIFIED ROTATOR CUFF TEAR OR RUPTURE OF UNSPECIFIED SHOULDER, NOT SPECIFIED AS TRAUMATIC: ICD-10-CM

## 2024-02-21 DIAGNOSIS — M19.011 PRIMARY OSTEOARTHRITIS, RIGHT SHOULDER: ICD-10-CM

## 2024-02-21 DIAGNOSIS — M19.012 PRIMARY OSTEOARTHRITIS, LEFT SHOULDER: ICD-10-CM

## 2024-02-21 PROCEDURE — 73030 X-RAY EXAM OF SHOULDER: CPT | Mod: LT

## 2024-02-21 PROCEDURE — 99214 OFFICE O/P EST MOD 30 MIN: CPT

## 2024-02-21 RX ORDER — MELOXICAM 15 MG/1
15 TABLET ORAL
Qty: 90 | Refills: 0 | Status: DISCONTINUED | COMMUNITY
Start: 2023-05-15 | End: 2024-02-21

## 2024-02-21 RX ORDER — LOSARTAN POTASSIUM 100 MG/1
TABLET, FILM COATED ORAL
Refills: 0 | Status: ACTIVE | COMMUNITY

## 2024-02-21 RX ORDER — MEMANTINE HYDROCHLORIDE 5 MG-10 MG
KIT ORAL
Refills: 0 | Status: ACTIVE | COMMUNITY

## 2024-02-21 RX ORDER — LABETALOL HYDROCHLORIDE 200 MG/1
200 TABLET, FILM COATED ORAL TWICE DAILY
Qty: 180 | Refills: 1 | Status: DISCONTINUED | COMMUNITY
Start: 2021-04-16 | End: 2024-02-21

## 2024-02-21 RX ORDER — CHLORTHALIDONE 50 MG/1
50 TABLET ORAL DAILY
Refills: 0 | Status: DISCONTINUED | COMMUNITY
End: 2024-02-21

## 2024-02-21 RX ORDER — DICLOFENAC SODIUM 1% 10 MG/G
1 GEL TOPICAL 4 TIMES DAILY
Qty: 1 | Refills: 1 | Status: DISCONTINUED | COMMUNITY
Start: 2022-08-01 | End: 2024-02-21

## 2024-02-21 RX ORDER — AMLODIPINE BESYLATE 5 MG/1
5 TABLET ORAL
Refills: 0 | Status: DISCONTINUED | COMMUNITY
End: 2024-02-21

## 2024-02-21 RX ORDER — OXYCODONE 5 MG/1
5 TABLET ORAL
Qty: 20 | Refills: 0 | Status: DISCONTINUED | COMMUNITY
Start: 2022-06-05 | End: 2024-02-21

## 2024-02-21 NOTE — PHYSICAL EXAM
[Normal Coordination] : normal coordination [Normal Sensation] : normal sensation [Normal Mood and Affect] : normal mood and affect [Oriented] : oriented [Able to Communicate] : able to communicate [Well Developed] : well developed [Well Nourished] : well nourished [Left] : left shoulder [Bilateral] : shoulder bilaterally [NL (0-70)] : full internal rotation 0-70 degrees [NL (0-90)] : full external rotation 0-90 degrees [3 ___] : forward flexion 3[unfilled]/5 [3___] : abduction 3[unfilled]/5 [4___] : internal rotation 4[unfilled]/5 [] : motor and sensory intact distally [Cephalic migration of humeral head] : Cephalic migration of humeral head [FreeTextEntry1] : old surgical anchors from prior rotator cuff repair, cephalic migration of humeral head [TWNoteComboBox7] : active forward flexion 90 degrees [de-identified] : active abduction 85 degrees

## 2024-02-21 NOTE — HISTORY OF PRESENT ILLNESS
[de-identified] : Patient reports loss of ROM in the LT shoulder that interferes with all ADLs. He is RHD

## 2024-02-21 NOTE — DISCUSSION/SUMMARY
[de-identified] : We discussed formal PT, a home exercise program, ice therapy and the role of NSAIDS for the pain. These modalities will improve the patient's functionality in their activities of daily life.  Risks, benefits and contraindications were discussed.  The patient will follow up in 6 weeks or sooner as needed. Patient would like to discuss possible cortisone injection but we discussed that in the absence of pain it would be unlikely to benefit him.

## 2024-06-08 ENCOUNTER — OFFICE (OUTPATIENT)
Dept: URBAN - METROPOLITAN AREA CLINIC 97 | Facility: CLINIC | Age: 84
Setting detail: OPHTHALMOLOGY
End: 2024-06-08
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
## 2024-06-08 PROCEDURE — NO SHOW FE NO SHOW FEE: Performed by: OPHTHALMOLOGY

## 2024-08-23 ENCOUNTER — NON-APPOINTMENT (OUTPATIENT)
Age: 84
End: 2024-08-23

## 2024-08-23 ENCOUNTER — APPOINTMENT (OUTPATIENT)
Dept: CARDIOLOGY | Facility: CLINIC | Age: 84
End: 2024-08-23
Payer: MEDICARE

## 2024-08-23 VITALS
HEART RATE: 55 BPM | WEIGHT: 166 LBS | SYSTOLIC BLOOD PRESSURE: 120 MMHG | BODY MASS INDEX: 25.24 KG/M2 | DIASTOLIC BLOOD PRESSURE: 50 MMHG | OXYGEN SATURATION: 98 %

## 2024-08-23 DIAGNOSIS — I38 ENDOCARDITIS, VALVE UNSPECIFIED: ICD-10-CM

## 2024-08-23 DIAGNOSIS — R01.1 CARDIAC MURMUR, UNSPECIFIED: ICD-10-CM

## 2024-08-23 DIAGNOSIS — I10 ESSENTIAL (PRIMARY) HYPERTENSION: ICD-10-CM

## 2024-08-23 DIAGNOSIS — M51.36 OTHER INTERVERTEBRAL DISC DEGENERATION, LUMBAR REGION: ICD-10-CM

## 2024-08-23 DIAGNOSIS — M19.90 UNSPECIFIED OSTEOARTHRITIS, UNSPECIFIED SITE: ICD-10-CM

## 2024-08-23 DIAGNOSIS — K21.9 GASTRO-ESOPHAGEAL REFLUX DISEASE W/OUT ESOPHAGITIS: ICD-10-CM

## 2024-08-23 DIAGNOSIS — Z13.6 ENCOUNTER FOR SCREENING FOR CARDIOVASCULAR DISORDERS: ICD-10-CM

## 2024-08-23 PROCEDURE — G2211 COMPLEX E/M VISIT ADD ON: CPT

## 2024-08-23 PROCEDURE — 93000 ELECTROCARDIOGRAM COMPLETE: CPT

## 2024-08-23 PROCEDURE — 99203 OFFICE O/P NEW LOW 30 MIN: CPT

## 2024-08-23 NOTE — HISTORY OF PRESENT ILLNESS
[FreeTextEntry1] : 83 year old gentleman with well-controlled HTN, dementia, who is doing well, presenting to establish cardiovascular care at our office. I see his wife and son as patients as well.  No chest pain, shortness of breath, or decline in exercise capacity.   LABS 06/22/24:  Hgb 15.4  Platelet 188  Cr. 1.2

## 2024-08-23 NOTE — DISCUSSION/SUMMARY
[EKG obtained to assist in diagnosis and management of assessed problem(s)] : EKG obtained to assist in diagnosis and management of assessed problem(s) [FreeTextEntry1] : 83 year old gentleman with well-controlled HTN, and dementia who presents to establish cardiovascular care. Patient is in good health, but has hypertension with concern for hypertensive heart disease given some decline in exercise capacity, and a 1-2/4 diastolic murmur on examination.   Plan:   - Continue current medications  - Obtaine TTE for evaluation of hypertensive heart disease.   - Obtain bloodwork for CAD risk stratification and optimization.   Plan: RTC 1-2 months   Appreciate the opportunity to participate in the care of Mr. TIPTON. Strict ER precautions were provided to patient for symptoms that arise or worsen. Please do not hesitate to reach out with any questions, concerns, or changes in clinical status.   Houston Miller MD, [facility]  Interventional & Clinical Cardiology  Altogether, I had the privilege of spending 35 minutes on this patient's care, more than 50% of which was during a face-to-face encounter. This does not include time required to obtain/interpret an ECG or time invested in the education of medical trainees who may have participated in the patient's care.

## 2024-09-28 ENCOUNTER — NON-APPOINTMENT (OUTPATIENT)
Age: 84
End: 2024-09-28

## 2024-10-11 ENCOUNTER — APPOINTMENT (OUTPATIENT)
Dept: CARDIOLOGY | Facility: CLINIC | Age: 84
End: 2024-10-11
Payer: MEDICARE

## 2024-10-11 ENCOUNTER — NON-APPOINTMENT (OUTPATIENT)
Age: 84
End: 2024-10-11

## 2024-10-11 VITALS
HEART RATE: 60 BPM | BODY MASS INDEX: 25.46 KG/M2 | SYSTOLIC BLOOD PRESSURE: 132 MMHG | HEIGHT: 68 IN | OXYGEN SATURATION: 97 % | DIASTOLIC BLOOD PRESSURE: 54 MMHG | WEIGHT: 168 LBS

## 2024-10-11 PROCEDURE — 93306 TTE W/DOPPLER COMPLETE: CPT

## 2024-10-11 PROCEDURE — G2211 COMPLEX E/M VISIT ADD ON: CPT

## 2024-10-11 PROCEDURE — 99214 OFFICE O/P EST MOD 30 MIN: CPT

## 2024-10-11 RX ORDER — DONEPEZIL HYDROCHLORIDE 10 MG/1
10 TABLET ORAL DAILY
Refills: 0 | Status: ACTIVE | COMMUNITY

## 2024-10-11 RX ORDER — CYANOCOBALAMIN (VITAMIN B-12) 1000 MCG
TABLET ORAL DAILY
Refills: 0 | Status: ACTIVE | COMMUNITY

## 2024-10-11 RX ORDER — BACILLUS COAGULANS/INULIN 1B-250 MG
CAPSULE ORAL DAILY
Refills: 0 | Status: ACTIVE | COMMUNITY

## 2024-10-11 RX ORDER — MULTIVIT-MIN/IRON/FOLIC ACID/K 18-600-40
CAPSULE ORAL DAILY
Refills: 0 | Status: ACTIVE | COMMUNITY

## 2024-10-11 RX ORDER — GLUCOSAMINE SULFATE 500 MG
CAPSULE ORAL
Refills: 0 | Status: ACTIVE | COMMUNITY

## 2024-10-11 RX ORDER — CHOLECALCIFEROL (VITAMIN D3) 50 MCG
TABLET ORAL DAILY
Refills: 0 | Status: ACTIVE | COMMUNITY

## 2024-12-24 PROBLEM — F10.90 ALCOHOL USE: Status: ACTIVE | Noted: 2020-05-08

## 2025-01-09 ENCOUNTER — APPOINTMENT (OUTPATIENT)
Dept: CARDIOLOGY | Facility: CLINIC | Age: 85
End: 2025-01-09
Payer: MEDICARE

## 2025-01-09 ENCOUNTER — NON-APPOINTMENT (OUTPATIENT)
Age: 85
End: 2025-01-09

## 2025-01-09 VITALS
HEIGHT: 68 IN | WEIGHT: 165 LBS | BODY MASS INDEX: 25.01 KG/M2 | DIASTOLIC BLOOD PRESSURE: 50 MMHG | SYSTOLIC BLOOD PRESSURE: 128 MMHG | HEART RATE: 60 BPM

## 2025-01-09 DIAGNOSIS — K21.9 GASTRO-ESOPHAGEAL REFLUX DISEASE W/OUT ESOPHAGITIS: ICD-10-CM

## 2025-01-09 DIAGNOSIS — R06.02 SHORTNESS OF BREATH: ICD-10-CM

## 2025-01-09 DIAGNOSIS — Z13.6 ENCOUNTER FOR SCREENING FOR CARDIOVASCULAR DISORDERS: ICD-10-CM

## 2025-01-09 DIAGNOSIS — G62.9 POLYNEUROPATHY, UNSPECIFIED: ICD-10-CM

## 2025-01-09 DIAGNOSIS — I10 ESSENTIAL (PRIMARY) HYPERTENSION: ICD-10-CM

## 2025-01-09 PROCEDURE — G2211 COMPLEX E/M VISIT ADD ON: CPT

## 2025-01-09 PROCEDURE — 99214 OFFICE O/P EST MOD 30 MIN: CPT

## 2025-02-17 ENCOUNTER — OFFICE (OUTPATIENT)
Dept: URBAN - METROPOLITAN AREA CLINIC 97 | Facility: CLINIC | Age: 85
Setting detail: OPHTHALMOLOGY
End: 2025-02-17
Payer: MEDICARE

## 2025-02-17 DIAGNOSIS — H21.563: ICD-10-CM

## 2025-02-17 DIAGNOSIS — H40.1131: ICD-10-CM

## 2025-02-17 DIAGNOSIS — H40.033: ICD-10-CM

## 2025-02-17 DIAGNOSIS — H02.831: ICD-10-CM

## 2025-02-17 DIAGNOSIS — H02.834: ICD-10-CM

## 2025-02-17 DIAGNOSIS — H35.3131: ICD-10-CM

## 2025-02-17 DIAGNOSIS — H43.813: ICD-10-CM

## 2025-02-17 DIAGNOSIS — H25.13: ICD-10-CM

## 2025-02-17 PROCEDURE — 92014 COMPRE OPH EXAM EST PT 1/>: CPT | Performed by: OPTOMETRIST

## 2025-02-17 PROCEDURE — 92133 CPTRZD OPH DX IMG PST SGM ON: CPT | Performed by: OPTOMETRIST

## 2025-02-17 PROCEDURE — 92083 EXTENDED VISUAL FIELD XM: CPT | Performed by: OPTOMETRIST

## 2025-02-17 ASSESSMENT — REFRACTION_MANIFEST
OD_VA2: 20/20(J1+)
OD_VA2: 20/20(J1+)
OU_VA: 20/20
OS_VA2: 20/20(J1+)
OS_ADD: +2.50
OS_VA1: 20/20-
OU_VA: 20/20
OD_VA1: 20/20-
OS_SPHERE: -2.00
OS_AXIS: 105
OS_CYLINDER: -1.50
OD_CYLINDER: -1.50
OS_CYLINDER: -1.50
OS_AXIS: 105
OD_AXIS: 085
OD_SPHERE: -1.50
OD_AXIS: 085
OD_ADD: +2.50
OD_CYLINDER: -1.25
OD_ADD: +2.50
OS_SPHERE: -2.00
OD_SPHERE: -1.50
OS_VA1: 20/20-
OS_VA2: 20/20(J1+)
OS_ADD: +2.50
OD_VA1: 20/20-

## 2025-02-17 ASSESSMENT — PACHYMETRY
OD_CT_CORRECTION: 4
OS_CT_UM: 488
OS_CT_CORRECTION: 4
OD_CT_UM: 491

## 2025-02-17 ASSESSMENT — PUNCTA - ASSESSMENT: OD_PUNCTA: SCLEROSED RLL

## 2025-02-17 ASSESSMENT — VISUAL ACUITY
OD_BCVA: 20/30
OS_BCVA: 20/40

## 2025-02-17 ASSESSMENT — KERATOMETRY
OS_K1POWER_DIOPTERS: 43.00
OD_AXISANGLE_DEGREES: 172
METHOD_AUTO_MANUAL: AUTO
OS_AXISANGLE_DEGREES: 015
OS_K2POWER_DIOPTERS: 43.75
OD_K2POWER_DIOPTERS: 44.00
OD_K1POWER_DIOPTERS: 42.75

## 2025-02-17 ASSESSMENT — REFRACTION_CURRENTRX
OD_AXIS: 176
OD_SPHERE: -3.00
OD_CYLINDER: +1.00
OS_OVR_VA: 20/
OS_CYLINDER: +1.50
OD_ADD: +2.50
OD_OVR_VA: 20/
OD_VPRISM_DIRECTION: PROGS
OS_AXIS: 015
OS_ADD: +2.50
OS_VPRISM_DIRECTION: PROGS
OS_SPHERE: -3.25

## 2025-02-17 ASSESSMENT — REFRACTION_AUTOREFRACTION
OD_CYLINDER: +1.75
OD_AXIS: 169
OD_SPHERE: -4.50
OS_AXIS: 004
OS_SPHERE: -4.00
OS_CYLINDER: +1.75

## 2025-02-17 ASSESSMENT — CONFRONTATIONAL VISUAL FIELD TEST (CVF)
OS_FINDINGS: FULL
OD_FINDINGS: FULL

## 2025-02-17 ASSESSMENT — LID POSITION - DERMATOCHALASIS
OS_DERMATOCHALASIS: LUL
OD_DERMATOCHALASIS: RUL

## 2025-05-16 ENCOUNTER — OFFICE (OUTPATIENT)
Dept: URBAN - METROPOLITAN AREA CLINIC 38 | Facility: CLINIC | Age: 85
Setting detail: OPHTHALMOLOGY
End: 2025-05-16
Payer: MEDICARE

## 2025-05-16 DIAGNOSIS — H40.2222: ICD-10-CM

## 2025-05-16 DIAGNOSIS — H40.2213: ICD-10-CM

## 2025-05-16 DIAGNOSIS — H02.831: ICD-10-CM

## 2025-05-16 DIAGNOSIS — H21.563: ICD-10-CM

## 2025-05-16 DIAGNOSIS — H02.834: ICD-10-CM

## 2025-05-16 DIAGNOSIS — H25.13: ICD-10-CM

## 2025-05-16 PROCEDURE — 92020 GONIOSCOPY: CPT | Performed by: STUDENT IN AN ORGANIZED HEALTH CARE EDUCATION/TRAINING PROGRAM

## 2025-05-16 PROCEDURE — 92083 EXTENDED VISUAL FIELD XM: CPT | Performed by: STUDENT IN AN ORGANIZED HEALTH CARE EDUCATION/TRAINING PROGRAM

## 2025-05-16 PROCEDURE — 99214 OFFICE O/P EST MOD 30 MIN: CPT | Performed by: STUDENT IN AN ORGANIZED HEALTH CARE EDUCATION/TRAINING PROGRAM

## 2025-05-16 PROCEDURE — 76514 ECHO EXAM OF EYE THICKNESS: CPT | Performed by: STUDENT IN AN ORGANIZED HEALTH CARE EDUCATION/TRAINING PROGRAM

## 2025-05-16 PROCEDURE — 92133 CPTRZD OPH DX IMG PST SGM ON: CPT | Performed by: STUDENT IN AN ORGANIZED HEALTH CARE EDUCATION/TRAINING PROGRAM

## 2025-05-16 ASSESSMENT — REFRACTION_CURRENTRX
OS_ADD: +2.50
OD_SPHERE: -2.00
OS_SPHERE: -2.00
OD_OVR_VA: 20/
OS_SPHERE: -3.25
OS_OVR_VA: 20/
OD_OVR_VA: 20/
OD_AXIS: 176
OS_VPRISM_DIRECTION: PROGS
OD_SPHERE: -3.00
OS_CYLINDER: +1.50
OD_CYLINDER: -1.00
OD_VPRISM_DIRECTION: PROGS
OD_AXIS: 078
OS_ADD: +2.50
OS_VPRISM_DIRECTION: PROGS
OD_VPRISM_DIRECTION: PROGS
OS_AXIS: 111
OS_OVR_VA: 20/
OD_CYLINDER: +1.00
OD_ADD: +2.50
OD_ADD: +2.50
OS_CYLINDER: -1.50
OS_AXIS: 015

## 2025-05-16 ASSESSMENT — LID POSITION - DERMATOCHALASIS
OS_DERMATOCHALASIS: LUL
OD_DERMATOCHALASIS: RUL

## 2025-05-16 ASSESSMENT — REFRACTION_MANIFEST
OS_ADD: +2.50
OS_VA1: 20/20-
OD_AXIS: 085
OU_VA: 20/20
OS_ADD: +2.50
OD_VA1: 20/20-
OS_CYLINDER: -1.50
OD_ADD: +2.50
OD_SPHERE: -1.50
OD_VA2: 20/20(J1+)
OS_VA2: 20/20(J1+)
OD_AXIS: 085
OD_SPHERE: -1.50
OD_ADD: +2.50
OD_VA1: 20/20-
OD_CYLINDER: -1.50
OS_CYLINDER: -1.50
OU_VA: 20/20
OD_VA2: 20/20(J1+)
OS_VA2: 20/20(J1+)
OS_SPHERE: -2.00
OS_AXIS: 105
OS_SPHERE: -2.00
OS_AXIS: 105
OS_VA1: 20/20-
OD_CYLINDER: -1.25

## 2025-05-16 ASSESSMENT — KERATOMETRY
OD_K2POWER_DIOPTERS: 44.00
OS_K2POWER_DIOPTERS: 43.75
METHOD_AUTO_MANUAL: AUTO
OD_K1POWER_DIOPTERS: 42.50
OS_AXISANGLE_DEGREES: 010
OD_AXISANGLE_DEGREES: 002
OS_K1POWER_DIOPTERS: 42.75

## 2025-05-16 ASSESSMENT — REFRACTION_AUTOREFRACTION
OS_AXIS: 99
OD_AXIS: 090
OS_CYLINDER: -2.00
OD_SPHERE: -1.75
OS_SPHERE: -1.50
OD_CYLINDER: -2.50

## 2025-05-16 ASSESSMENT — PUNCTA - ASSESSMENT: OD_PUNCTA: SCLEROSED RLL

## 2025-05-16 ASSESSMENT — VISUAL ACUITY
OS_BCVA: 20/30-2
OD_BCVA: 20/25-

## 2025-05-16 ASSESSMENT — PACHYMETRY
OD_CT_CORRECTION: 4
OS_CT_UM: 488
OS_CT_CORRECTION: 4
OD_CT_UM: 491

## 2025-05-16 ASSESSMENT — TONOMETRY
OD_IOP_MMHG: 21
OS_IOP_MMHG: 18

## 2025-05-16 ASSESSMENT — CONFRONTATIONAL VISUAL FIELD TEST (CVF)
OD_FINDINGS: FULL
OS_FINDINGS: FULL

## 2025-06-13 ENCOUNTER — APPOINTMENT (OUTPATIENT)
Dept: CARDIOLOGY | Facility: CLINIC | Age: 85
End: 2025-06-13
Payer: MEDICARE

## 2025-06-13 VITALS
OXYGEN SATURATION: 95 % | SYSTOLIC BLOOD PRESSURE: 130 MMHG | DIASTOLIC BLOOD PRESSURE: 62 MMHG | HEIGHT: 68 IN | HEART RATE: 65 BPM

## 2025-06-13 PROCEDURE — 93000 ELECTROCARDIOGRAM COMPLETE: CPT

## 2025-06-13 PROCEDURE — 99204 OFFICE O/P NEW MOD 45 MIN: CPT

## 2025-06-13 PROCEDURE — G2211 COMPLEX E/M VISIT ADD ON: CPT

## 2025-06-27 ENCOUNTER — RX ONLY (RX ONLY)
Age: 85
End: 2025-06-27

## 2025-06-27 ENCOUNTER — OFFICE (OUTPATIENT)
Dept: URBAN - METROPOLITAN AREA CLINIC 38 | Facility: CLINIC | Age: 85
Setting detail: OPHTHALMOLOGY
End: 2025-06-27
Payer: MEDICARE

## 2025-06-27 DIAGNOSIS — H25.11: ICD-10-CM

## 2025-06-27 DIAGNOSIS — H21.563: ICD-10-CM

## 2025-06-27 DIAGNOSIS — H40.1132: ICD-10-CM

## 2025-06-27 DIAGNOSIS — H25.13: ICD-10-CM

## 2025-06-27 PROBLEM — H40.2212 CHRONIC ANGLE CLOSURE GLAUCOMA; RIGHT EYE MODERATE, LEFT EYE MODERATE: Status: ACTIVE | Noted: 2025-06-27

## 2025-06-27 PROBLEM — H40.2222 CHRONIC ANGLE CLOSURE GLAUCOMA; RIGHT EYE MODERATE, LEFT EYE MODERATE: Status: ACTIVE | Noted: 2025-06-27

## 2025-06-27 PROCEDURE — 92136 OPHTHALMIC BIOMETRY: CPT | Mod: 26,RT | Performed by: STUDENT IN AN ORGANIZED HEALTH CARE EDUCATION/TRAINING PROGRAM

## 2025-06-27 PROCEDURE — 92136 OPHTHALMIC BIOMETRY: CPT | Mod: TC | Performed by: STUDENT IN AN ORGANIZED HEALTH CARE EDUCATION/TRAINING PROGRAM

## 2025-06-27 PROCEDURE — 99213 OFFICE O/P EST LOW 20 MIN: CPT | Performed by: STUDENT IN AN ORGANIZED HEALTH CARE EDUCATION/TRAINING PROGRAM

## 2025-06-27 ASSESSMENT — REFRACTION_MANIFEST
OD_SPHERE: -1.50
OS_VA1: 20/20-
OD_SPHERE: -1.50
OD_VA1: 20/20-
OS_CYLINDER: -1.50
OS_SPHERE: -2.00
OD_AXIS: 085
OS_VA2: 20/20(J1+)
OS_ADD: +2.50
OS_SPHERE: -2.00
OS_CYLINDER: -1.50
OU_VA: 20/20
OD_VA2: 20/20(J1+)
OD_ADD: +2.50
OU_VA: 20/30
OD_VA2: 20/20(J1+)
OS_ADD: +2.50
OD_CYLINDER: -1.50
OS_VA2: 20/20(J1+)
OS_VA1: 20/30
OS_AXIS: 105
OD_VA1: 20/30
OD_ADD: +2.50
OD_CYLINDER: -1.25
OS_AXIS: 105
OD_AXIS: 085

## 2025-06-27 ASSESSMENT — KERATOMETRY
OD_K2POWER_DIOPTERS: 43.75
METHOD_AUTO_MANUAL: AUTO
OS_AXISANGLE_DEGREES: 176
OS_K2POWER_DIOPTERS: 4.75
OD_K1POWER_DIOPTERS: 42.50
OD_AXISANGLE_DEGREES: 170
OS_K1POWER_DIOPTERS: 43.25

## 2025-06-27 ASSESSMENT — REFRACTION_CURRENTRX
OD_CYLINDER: +1.00
OD_AXIS: 176
OD_OVR_VA: 20/
OS_AXIS: 111
OD_ADD: +2.50
OD_VPRISM_DIRECTION: PROGS
OD_ADD: +2.50
OS_OVR_VA: 20/
OS_SPHERE: -3.25
OD_AXIS: 078
OS_CYLINDER: -1.50
OD_OVR_VA: 20/
OS_ADD: +2.50
OS_CYLINDER: +1.50
OD_SPHERE: -2.00
OS_VPRISM_DIRECTION: PROGS
OD_SPHERE: -3.00
OS_OVR_VA: 20/
OS_ADD: +2.50
OD_CYLINDER: -1.00
OS_SPHERE: -2.00
OD_VPRISM_DIRECTION: PROGS
OS_AXIS: 015
OS_VPRISM_DIRECTION: PROGS

## 2025-06-27 ASSESSMENT — VISUAL ACUITY
OD_BCVA: 20/25-
OS_BCVA: 20/30-2

## 2025-06-27 ASSESSMENT — CONFRONTATIONAL VISUAL FIELD TEST (CVF)
OD_FINDINGS: FULL
OS_FINDINGS: FULL

## 2025-06-27 ASSESSMENT — REFRACTION_AUTOREFRACTION
OS_CYLINDER: -1.50
OS_AXIS: 089
OD_SPHERE: -2.00
OS_SPHERE: -2.25
OD_AXIS: 076
OD_CYLINDER: -2.75

## 2025-07-10 ENCOUNTER — AMBULATORY SURGERY CENTER (OUTPATIENT)
Dept: URBAN - METROPOLITAN AREA SURGERY 4 | Facility: SURGERY | Age: 85
Setting detail: OPHTHALMOLOGY
End: 2025-07-10
Payer: MEDICARE

## 2025-07-10 DIAGNOSIS — H40.2213: ICD-10-CM

## 2025-07-10 DIAGNOSIS — H25.11: ICD-10-CM

## 2025-07-10 PROCEDURE — 66984 XCAPSL CTRC RMVL W/O ECP: CPT | Mod: RT | Performed by: STUDENT IN AN ORGANIZED HEALTH CARE EDUCATION/TRAINING PROGRAM

## 2025-07-10 PROCEDURE — 65820 GONIOTOMY: CPT | Mod: RT | Performed by: STUDENT IN AN ORGANIZED HEALTH CARE EDUCATION/TRAINING PROGRAM

## 2025-07-11 ENCOUNTER — RX ONLY (RX ONLY)
Age: 85
End: 2025-07-11

## 2025-07-11 ENCOUNTER — OFFICE (OUTPATIENT)
Dept: URBAN - METROPOLITAN AREA CLINIC 38 | Facility: CLINIC | Age: 85
Setting detail: OPHTHALMOLOGY
End: 2025-07-11
Payer: MEDICARE

## 2025-07-11 DIAGNOSIS — H25.12: ICD-10-CM

## 2025-07-11 PROCEDURE — 92136 OPHTHALMIC BIOMETRY: CPT | Mod: 26,LT | Performed by: STUDENT IN AN ORGANIZED HEALTH CARE EDUCATION/TRAINING PROGRAM

## 2025-07-11 ASSESSMENT — REFRACTION_CURRENTRX
OD_SPHERE: -3.00
OS_ADD: +2.50
OS_AXIS: 015
OD_VPRISM_DIRECTION: PROGS
OD_VPRISM_DIRECTION: PROGS
OS_VPRISM_DIRECTION: PROGS
OD_ADD: +2.50
OS_OVR_VA: 20/
OD_OVR_VA: 20/
OS_AXIS: 111
OS_SPHERE: -2.00
OD_CYLINDER: +1.00
OS_VPRISM_DIRECTION: PROGS
OS_CYLINDER: -1.50
OS_OVR_VA: 20/
OS_SPHERE: -3.25
OD_SPHERE: -2.00
OS_ADD: +2.50
OD_OVR_VA: 20/
OS_CYLINDER: +1.50
OD_AXIS: 078
OD_AXIS: 176
OD_CYLINDER: -1.00
OD_ADD: +2.50

## 2025-07-11 ASSESSMENT — KERATOMETRY
OS_K2POWER_DIOPTERS: 43.75
OD_AXISANGLE_DEGREES: 009
OS_AXISANGLE_DEGREES: 004
OD_K1POWER_DIOPTERS: 42.25
METHOD_AUTO_MANUAL: AUTO
OD_K2POWER_DIOPTERS: 44.00
OS_K1POWER_DIOPTERS: 41.75

## 2025-07-11 ASSESSMENT — REFRACTION_MANIFEST
OS_VA2: 20/20(J1+)
OD_AXIS: 085
OD_CYLINDER: -1.25
OD_VA1: 20/20-
OD_CYLINDER: -1.50
OS_AXIS: 105
OS_CYLINDER: -1.50
OS_AXIS: 105
OU_VA: 20/20
OS_SPHERE: -2.00
OS_ADD: +2.50
OD_SPHERE: -1.50
OS_ADD: +2.50
OS_VA1: 20/20-
OD_ADD: +2.50
OU_VA: 20/30
OS_VA2: 20/20(J1+)
OD_VA1: 20/30
OD_VA2: 20/20(J1+)
OD_SPHERE: -1.50
OS_VA1: 20/30
OD_ADD: +2.50
OS_CYLINDER: -1.50
OD_AXIS: 085
OD_VA2: 20/20(J1+)
OS_SPHERE: -2.00

## 2025-07-11 ASSESSMENT — PACHYMETRY
OS_CT_CORRECTION: 4
OD_CT_CORRECTION: 4
OS_CT_UM: 488
OD_CT_UM: 491

## 2025-07-11 ASSESSMENT — REFRACTION_AUTOREFRACTION
OD_AXIS: 089
OD_SPHERE: PLANO
OS_SPHERE: -1.50
OD_CYLINDER: -2.50
OS_AXIS: 098
OS_CYLINDER: -2.25

## 2025-07-11 ASSESSMENT — LID POSITION - DERMATOCHALASIS
OD_DERMATOCHALASIS: RUL
OS_DERMATOCHALASIS: LUL

## 2025-07-11 ASSESSMENT — CONFRONTATIONAL VISUAL FIELD TEST (CVF)
OD_FINDINGS: FULL
OS_FINDINGS: FULL

## 2025-07-11 ASSESSMENT — TONOMETRY
OS_IOP_MMHG: 18
OD_IOP_MMHG: 10

## 2025-07-11 ASSESSMENT — VISUAL ACUITY
OD_BCVA: 20/25-
OS_BCVA: 20/50-1

## 2025-07-11 ASSESSMENT — PUNCTA - ASSESSMENT: OD_PUNCTA: SCLEROSED RLL

## 2025-07-16 ENCOUNTER — AMBULATORY SURGERY CENTER (OUTPATIENT)
Dept: URBAN - METROPOLITAN AREA SURGERY 4 | Facility: SURGERY | Age: 85
Setting detail: OPHTHALMOLOGY
End: 2025-07-16
Payer: MEDICARE

## 2025-07-16 DIAGNOSIS — H25.12: ICD-10-CM

## 2025-07-16 DIAGNOSIS — H40.2222: ICD-10-CM

## 2025-07-16 PROCEDURE — 65820 GONIOTOMY: CPT | Mod: 79,LT | Performed by: STUDENT IN AN ORGANIZED HEALTH CARE EDUCATION/TRAINING PROGRAM

## 2025-07-16 PROCEDURE — 66984 XCAPSL CTRC RMVL W/O ECP: CPT | Mod: 79,LT | Performed by: STUDENT IN AN ORGANIZED HEALTH CARE EDUCATION/TRAINING PROGRAM

## 2025-07-17 ENCOUNTER — OFFICE (OUTPATIENT)
Dept: URBAN - METROPOLITAN AREA CLINIC 38 | Facility: CLINIC | Age: 85
Setting detail: OPHTHALMOLOGY
End: 2025-07-17
Payer: MEDICARE

## 2025-07-17 ENCOUNTER — RX ONLY (RX ONLY)
Age: 85
End: 2025-07-17

## 2025-07-17 DIAGNOSIS — Z96.1: ICD-10-CM

## 2025-07-17 PROCEDURE — 99024 POSTOP FOLLOW-UP VISIT: CPT | Performed by: STUDENT IN AN ORGANIZED HEALTH CARE EDUCATION/TRAINING PROGRAM

## 2025-07-17 ASSESSMENT — REFRACTION_CURRENTRX
OD_OVR_VA: 20/
OD_OVR_VA: 20/
OD_CYLINDER: -1.00
OS_ADD: +2.50
OS_VPRISM_DIRECTION: PROGS
OS_CYLINDER: -1.50
OS_SPHERE: -3.25
OD_ADD: +2.50
OS_OVR_VA: 20/
OD_SPHERE: -2.00
OS_AXIS: 015
OS_AXIS: 111
OS_CYLINDER: +1.50
OS_VPRISM_DIRECTION: PROGS
OS_ADD: +2.50
OD_VPRISM_DIRECTION: PROGS
OD_SPHERE: -3.00
OD_CYLINDER: +1.00
OS_OVR_VA: 20/
OD_ADD: +2.50
OS_SPHERE: -2.00
OD_VPRISM_DIRECTION: PROGS
OD_AXIS: 078
OD_AXIS: 176

## 2025-07-17 ASSESSMENT — REFRACTION_MANIFEST
OS_VA2: 20/20(J1+)
OD_ADD: +2.50
OD_AXIS: 085
OD_CYLINDER: -1.25
OD_AXIS: 085
OD_VA1: 20/20
OS_CYLINDER: -1.50
OS_ADD: +2.50
OU_VA: 20/20
OU_VA: 20/20
OS_VA1: 20/20-
OS_VA1: 20/20
OS_ADD: +2.50
OD_SPHERE: -1.00
OD_SPHERE: -1.50
OD_CYLINDER: -1.25
OD_ADD: +2.50
OS_AXIS: 105
OS_VA2: 20/20(J1+)
OD_VA1: 20/20-
OS_CYLINDER: SPHERE
OD_VA2: 20/20(J1+)
OS_SPHERE: -2.00
OS_SPHERE: -2.00
OD_VA2: 20/20(J1+)

## 2025-07-17 ASSESSMENT — PUNCTA - ASSESSMENT: OD_PUNCTA: SCLEROSED RLL

## 2025-07-17 ASSESSMENT — PACHYMETRY
OS_CT_CORRECTION: 4
OS_CT_UM: 488
OD_CT_UM: 491
OD_CT_CORRECTION: 4

## 2025-07-17 ASSESSMENT — KERATOMETRY
METHOD_AUTO_MANUAL: AUTO
OD_K1POWER_DIOPTERS: 42.75
OD_K2POWER_DIOPTERS: 43.50
OD_AXISANGLE_DEGREES: 173
OS_K1POWER_DIOPTERS: 42.75
OS_K2POWER_DIOPTERS: 43.75
OS_AXISANGLE_DEGREES: 007

## 2025-07-17 ASSESSMENT — LID POSITION - DERMATOCHALASIS
OS_DERMATOCHALASIS: LUL
OD_DERMATOCHALASIS: RUL

## 2025-07-17 ASSESSMENT — VISUAL ACUITY
OS_BCVA: 20/50
OD_BCVA: 20/70

## 2025-07-17 ASSESSMENT — REFRACTION_AUTOREFRACTION
OS_CYLINDER: -0.75
OD_AXIS: 086
OS_AXIS: 104
OS_SPHERE: -2.00
OD_SPHERE: -1.25
OD_CYLINDER: -1.25

## 2025-07-17 ASSESSMENT — TONOMETRY: OS_IOP_MMHG: 14

## 2025-07-25 ENCOUNTER — OFFICE (OUTPATIENT)
Dept: URBAN - METROPOLITAN AREA CLINIC 38 | Facility: CLINIC | Age: 85
Setting detail: OPHTHALMOLOGY
End: 2025-07-25
Payer: MEDICARE

## 2025-07-25 DIAGNOSIS — Z96.1: ICD-10-CM

## 2025-07-25 PROCEDURE — 99024 POSTOP FOLLOW-UP VISIT: CPT | Performed by: STUDENT IN AN ORGANIZED HEALTH CARE EDUCATION/TRAINING PROGRAM

## 2025-07-25 ASSESSMENT — REFRACTION_CURRENTRX
OD_OVR_VA: 20/
OS_AXIS: 111
OD_VPRISM_DIRECTION: PROGS
OD_AXIS: 078
OS_OVR_VA: 20/
OS_ADD: +2.50
OS_SPHERE: -2.00
OD_ADD: +2.50
OD_CYLINDER: +1.00
OS_VPRISM_DIRECTION: PROGS
OS_ADD: +2.50
OD_SPHERE: -2.00
OD_VPRISM_DIRECTION: PROGS
OD_AXIS: 176
OS_VPRISM_DIRECTION: PROGS
OD_OVR_VA: 20/
OS_CYLINDER: -1.50
OS_CYLINDER: +1.50
OS_AXIS: 015
OD_SPHERE: -3.00
OD_CYLINDER: -1.00
OD_ADD: +2.50
OS_SPHERE: -3.25
OS_OVR_VA: 20/

## 2025-07-25 ASSESSMENT — LID POSITION - DERMATOCHALASIS
OS_DERMATOCHALASIS: LUL
OD_DERMATOCHALASIS: RUL

## 2025-07-25 ASSESSMENT — REFRACTION_MANIFEST
OD_CYLINDER: -1.00
OS_VA1: 20/20-
OU_VA: 20/20
OD_AXIS: 080
OD_VA2: 20/20(J1+)
OS_AXIS: 105
OS_SPHERE: -1.25
OS_ADD: +2.50
OS_ADD: +2.75
OS_SPHERE: -2.00
OD_VA1: 20/20-
OU_VA: 20/20
OD_SPHERE: -1.25
OD_SPHERE: -1.50
OS_VA2: 20/20(J1+)
OS_CYLINDER: -1.50
OS_CYLINDER: -1.50
OD_ADD: +2.50
OS_AXIS: 095
OS_VA2: 20/20(J1+)
OS_VA1: 20/20
OD_ADD: +2.75
OD_VA1: 20/20
OD_CYLINDER: -1.25
OD_AXIS: 085
OD_VA2: 20/20(J1+)

## 2025-07-25 ASSESSMENT — REFRACTION_AUTOREFRACTION
OD_AXIS: 080
OS_AXIS: 093
OS_SPHERE: -1.25
OD_SPHERE: -1.25
OD_CYLINDER: -1.00
OS_CYLINDER: -1.50

## 2025-07-25 ASSESSMENT — CONFRONTATIONAL VISUAL FIELD TEST (CVF)
OD_FINDINGS: FULL
OS_FINDINGS: FULL

## 2025-07-25 ASSESSMENT — KERATOMETRY
OD_AXISANGLE_DEGREES: 172
OS_K2POWER_DIOPTERS: 44.00
OS_K1POWER_DIOPTERS: 42.75
OS_AXISANGLE_DEGREES: 012
OD_K1POWER_DIOPTERS: 43.00
METHOD_AUTO_MANUAL: AUTO
OD_K2POWER_DIOPTERS: 43.75

## 2025-07-25 ASSESSMENT — PACHYMETRY
OD_CT_UM: 491
OS_CT_UM: 488
OD_CT_CORRECTION: 4
OS_CT_CORRECTION: 4

## 2025-07-25 ASSESSMENT — TONOMETRY
OS_IOP_MMHG: 11
OD_IOP_MMHG: 12

## 2025-07-25 ASSESSMENT — PUNCTA - ASSESSMENT: OD_PUNCTA: SCLEROSED RLL

## 2025-07-25 ASSESSMENT — VISUAL ACUITY
OS_BCVA: 20/25-2
OD_BCVA: 20/25

## 2025-08-15 ENCOUNTER — RX ONLY (RX ONLY)
Age: 85
End: 2025-08-15

## 2025-08-15 ENCOUNTER — OFFICE (OUTPATIENT)
Dept: URBAN - METROPOLITAN AREA CLINIC 38 | Facility: CLINIC | Age: 85
Setting detail: OPHTHALMOLOGY
End: 2025-08-15
Payer: MEDICARE

## 2025-08-15 DIAGNOSIS — Z96.1: ICD-10-CM

## 2025-08-15 DIAGNOSIS — H40.2222: ICD-10-CM

## 2025-08-15 DIAGNOSIS — H40.2212: ICD-10-CM

## 2025-08-15 DIAGNOSIS — H43.813: ICD-10-CM

## 2025-08-15 DIAGNOSIS — H35.3131: ICD-10-CM

## 2025-08-15 PROCEDURE — 99024 POSTOP FOLLOW-UP VISIT: CPT | Performed by: STUDENT IN AN ORGANIZED HEALTH CARE EDUCATION/TRAINING PROGRAM

## 2025-08-15 ASSESSMENT — REFRACTION_AUTOREFRACTION
OD_AXIS: 089
OS_CYLINDER: -1.25
OS_SPHERE: -0.75
OS_AXIS: 088
OD_CYLINDER: -2.00
OD_SPHERE: -0.25

## 2025-08-15 ASSESSMENT — KERATOMETRY
METHOD_AUTO_MANUAL: AUTO
OD_AXISANGLE_DEGREES: 180
OD_K2POWER_DIOPTERS: 44.25
OS_K2POWER_DIOPTERS: 43.50
OD_K1POWER_DIOPTERS: 42.25
OS_AXISANGLE_DEGREES: 020
OS_K1POWER_DIOPTERS: 42.75

## 2025-08-15 ASSESSMENT — REFRACTION_MANIFEST
OS_AXIS: 105
OS_SPHERE: -2.00
OS_VA2: 20/20(J1+)
OS_CYLINDER: -1.50
OS_CYLINDER: -1.25
OS_VA1: 20/20-
OD_SPHERE: -1.50
OS_SPHERE: -1.50
OS_ADD: +2.25
OD_SPHERE: -0.25
OU_VA: 20/20
OD_VA2: 20/20(J1+)
OS_VA1: 20/20
OD_AXIS: 085
OD_VA2: 20/20(J1+)
OD_VA1: 20/20-
OS_VA2: 20/20(J1+)
OD_CYLINDER: -1.25
OS_AXIS: 090
OD_CYLINDER: -1.50
OD_AXIS: 090
OS_ADD: +2.50
OD_ADD: +2.25
OD_VA1: 20/40-
OD_ADD: +2.50
OU_VA: 20/20

## 2025-08-15 ASSESSMENT — REFRACTION_CURRENTRX
OD_SPHERE: -3.00
OS_VPRISM_DIRECTION: PROGS
OD_ADD: +2.50
OS_SPHERE: -2.00
OD_CYLINDER: -1.00
OD_SPHERE: -2.00
OD_AXIS: 176
OD_AXIS: 078
OD_CYLINDER: +1.00
OS_AXIS: 111
OS_ADD: +2.50
OS_OVR_VA: 20/
OD_OVR_VA: 20/
OS_OVR_VA: 20/
OS_AXIS: 015
OS_VPRISM_DIRECTION: PROGS
OD_VPRISM_DIRECTION: PROGS
OD_OVR_VA: 20/
OS_SPHERE: -3.25
OD_VPRISM_DIRECTION: PROGS
OD_ADD: +2.50
OS_CYLINDER: +1.50
OS_ADD: +2.50
OS_CYLINDER: -1.50

## 2025-08-15 ASSESSMENT — TONOMETRY
OD_IOP_MMHG: 13
OS_IOP_MMHG: 13

## 2025-08-15 ASSESSMENT — LID POSITION - DERMATOCHALASIS
OS_DERMATOCHALASIS: LUL
OD_DERMATOCHALASIS: RUL

## 2025-08-15 ASSESSMENT — CONFRONTATIONAL VISUAL FIELD TEST (CVF)
OD_FINDINGS: FULL
OS_FINDINGS: FULL

## 2025-08-15 ASSESSMENT — PACHYMETRY
OD_CT_UM: 491
OD_CT_CORRECTION: 4
OS_CT_UM: 488
OS_CT_CORRECTION: 4

## 2025-08-15 ASSESSMENT — VISUAL ACUITY
OD_BCVA: 20/40
OS_BCVA: 20/40-

## 2025-08-15 ASSESSMENT — PUNCTA - ASSESSMENT: OD_PUNCTA: SCLEROSED RLL
